# Patient Record
Sex: MALE | Race: WHITE | NOT HISPANIC OR LATINO | Employment: FULL TIME | URBAN - METROPOLITAN AREA
[De-identification: names, ages, dates, MRNs, and addresses within clinical notes are randomized per-mention and may not be internally consistent; named-entity substitution may affect disease eponyms.]

---

## 2017-03-02 ENCOUNTER — OFFICE VISIT (OUTPATIENT)
Dept: BARIATRICS/WEIGHT MGMT | Facility: CLINIC | Age: 42
End: 2017-03-02

## 2017-03-02 VITALS
SYSTOLIC BLOOD PRESSURE: 153 MMHG | WEIGHT: 292 LBS | HEART RATE: 70 BPM | TEMPERATURE: 98.6 F | HEIGHT: 70 IN | BODY MASS INDEX: 41.8 KG/M2 | DIASTOLIC BLOOD PRESSURE: 94 MMHG | RESPIRATION RATE: 18 BRPM

## 2017-03-02 DIAGNOSIS — IMO0001 ELEVATED BLOOD PRESSURE: ICD-10-CM

## 2017-03-02 DIAGNOSIS — R06.83 SNORING: ICD-10-CM

## 2017-03-02 DIAGNOSIS — E66.01 OBESITY, CLASS III, BMI 40-49.9 (MORBID OBESITY) (HCC): Primary | ICD-10-CM

## 2017-03-02 DIAGNOSIS — K21.9 GASTROESOPHAGEAL REFLUX DISEASE, ESOPHAGITIS PRESENCE NOT SPECIFIED: ICD-10-CM

## 2017-03-02 DIAGNOSIS — Z71.3 DIETARY COUNSELING: ICD-10-CM

## 2017-03-02 DIAGNOSIS — R53.82 CHRONIC FATIGUE: ICD-10-CM

## 2017-03-02 PROCEDURE — 99024 POSTOP FOLLOW-UP VISIT: CPT | Performed by: NURSE PRACTITIONER

## 2017-03-02 NOTE — PROGRESS NOTES
[facility] BARIATRIC SURGERY  [address]  [facility] [address]  [address]  [facility] [address]  Dept: [phone]  3/2/2017      Logan Fuchs.  [id number]  [id number]  1975  male      Chief Complaint   Patient presents with   • Follow-up     s/p gastric sleeve        BH Post-Op Bariatric Surgery:   Logan Fuchs is status post laparopscopic Sleeve procedure, performed on 11/14/16    HPI:   Today's weight is 292 lb (132 kg) pounds, today's BMI is Body mass index is 42.2 kg/(m^2)., he has a  loss of 39 pounds since the last visit and his weight loss since surgery is 97 pounds. The patient reports a decreased portion size and loss of appetite.      Logan Fuchs denies nausea, vomiting, dysphagia, abdominal pain or heartburn. Patient will only have regurgitation if he eats too fast.     Diet and Exercise: Diet history reviewed and discussed with the patient. Weight loss/gains to date discussed with the patient. The patient states they are eating 65-80 grams of protein per day. He reports eating 3 meals per day, a typical portion size of 1/2 cup, eating 2-3 snacks per day, drinking 5 or more 8-oz. glasses of water per day, no carbonated beverage consumption and exercising regularly- has done some weight training but doing more cardio; thinking about getting a bike for his house.     Supplements: ran out of bariatric MVI about a month ago so took OTC but purchased more BA today.     Review of Systems   All other systems reviewed and are negative.      Patient Active Problem List   Diagnosis   • Obesity, Class III, BMI 40-49.9 (morbid obesity)   • GERD (gastroesophageal reflux disease)   • Elevated blood pressure   • Snoring   • Knee pain   • Dietary counseling   • Chronic fatigue       The following portions of the patient's history were reviewed and updated as appropriate: allergies, current medications, past family  history, past medical history, past social history, past surgical history and problem list.    Vitals:    03/02/17 1326   BP: 153/94   Pulse: 70   Resp: 18   Temp: 98.6 °F (37 °C)       Physical Exam   Constitutional: He is oriented to person, place, and time. He appears well-developed and well-nourished.   HENT:   Head: Normocephalic and atraumatic.   Eyes: EOM are normal.   Cardiovascular: Normal rate, regular rhythm and normal heart sounds.    Pulmonary/Chest: Effort normal and breath sounds normal.   Abdominal: Soft. Bowel sounds are normal. He exhibits no distension. There is no tenderness.   Musculoskeletal: Normal range of motion.   Neurological: He is alert and oriented to person, place, and time.   Skin: Skin is warm and dry.   Psychiatric: He has a normal mood and affect. His behavior is normal. Judgment and thought content normal.   Vitals reviewed.        Assessment:   Post-op, the patient is doing well.     Plan:     Encouraged patient to keep up the great work- continue with plenty of lean protein through small frequent meals. Continue with plenty of water and vitamins. Increase exercise as tolerated.  Activity restrictions: none.   Recommended patient be sure to get at least 70 grams of protein per day by eating small, frequent meals all with high lean protein choices. Be sure to limit/cut back on daily carbohydrate intake. Discussed with the patient the recommended amount of water per day to intake- half of body weight in ounces. Reviewed vitamin requirements. Be sure to do routine exercise, 150 minutes per week minimum, including both cardio and strength training.     Instructions / Recommendations: dietary counseling recommended, recommended a daily protein intake of  grams, vitamin supplement(s) recommended, recommended exercising at least 150 minutes per week, behavior modifications recommended and instructed to call the office for concerns, questions, or problems.     The patient was  instructed to follow up in 3 months.     The patient was counseled regarding. Total time spent face to face was 10 minutes and more than half the time was spent counseling.

## 2017-05-11 ENCOUNTER — OFFICE VISIT (OUTPATIENT)
Dept: BARIATRICS/WEIGHT MGMT | Facility: CLINIC | Age: 42
End: 2017-05-11

## 2017-05-11 VITALS
HEIGHT: 70 IN | DIASTOLIC BLOOD PRESSURE: 81 MMHG | HEART RATE: 75 BPM | SYSTOLIC BLOOD PRESSURE: 134 MMHG | TEMPERATURE: 98.5 F | RESPIRATION RATE: 16 BRPM | WEIGHT: 265 LBS | BODY MASS INDEX: 37.94 KG/M2

## 2017-05-11 DIAGNOSIS — R53.82 CHRONIC FATIGUE: ICD-10-CM

## 2017-05-11 DIAGNOSIS — IMO0001 ELEVATED BLOOD PRESSURE: ICD-10-CM

## 2017-05-11 DIAGNOSIS — Z71.3 DIETARY COUNSELING: ICD-10-CM

## 2017-05-11 DIAGNOSIS — E66.9 OBESITY (BMI 30-39.9): Primary | ICD-10-CM

## 2017-05-11 DIAGNOSIS — R06.83 SNORING: ICD-10-CM

## 2017-05-11 PROCEDURE — 99024 POSTOP FOLLOW-UP VISIT: CPT | Performed by: NURSE PRACTITIONER

## 2017-08-07 ENCOUNTER — OFFICE VISIT (OUTPATIENT)
Dept: BARIATRICS/WEIGHT MGMT | Facility: CLINIC | Age: 42
End: 2017-08-07

## 2017-08-07 VITALS
DIASTOLIC BLOOD PRESSURE: 89 MMHG | WEIGHT: 234 LBS | HEART RATE: 79 BPM | HEIGHT: 70 IN | TEMPERATURE: 98.3 F | BODY MASS INDEX: 33.5 KG/M2 | SYSTOLIC BLOOD PRESSURE: 148 MMHG | RESPIRATION RATE: 18 BRPM

## 2017-08-07 DIAGNOSIS — E66.9 OBESITY (BMI 30-39.9): Primary | ICD-10-CM

## 2017-08-07 DIAGNOSIS — R06.83 SNORING: ICD-10-CM

## 2017-08-07 DIAGNOSIS — IMO0001 ELEVATED BLOOD PRESSURE: ICD-10-CM

## 2017-08-07 DIAGNOSIS — Z71.3 DIETARY COUNSELING: ICD-10-CM

## 2017-08-07 DIAGNOSIS — R53.82 CHRONIC FATIGUE: ICD-10-CM

## 2017-08-07 PROCEDURE — 99213 OFFICE O/P EST LOW 20 MIN: CPT | Performed by: NURSE PRACTITIONER

## 2017-08-07 NOTE — PROGRESS NOTES
[facility]ATR[facility] BARIATRIC SURGERY  [address]  [facility] [id number] [phone]  [address]  [facility] [address] [phone]  Dept: [phone]  8/7/2017      Logan Fuchs.  [id number]  [id number]  1975  male      Chief Complaint   Patient presents with   • Follow-up     s/p gastric sleeve        BH Post-Op Bariatric Surgery:   Logan Fuchs is status post laparopscopic Sleeve procedure, performed on 11/14/16    HPI:   Today's weight is 234 lb (106 kg) pounds, today's BMI is Body mass index is 33.82 kg/(m^2)., he has a  loss of 31 pounds since the last visit and his weight loss since surgery is 145 pounds. The patient reports a decreased portion size and loss of appetite.      Logan Fuchs denies nausea, vomiting, dysphagia, abdominal pain or heartburn.     Diet and Exercise: Diet history reviewed and discussed with the patient. Weight loss/gains to date discussed with the patient. The patient states they are eating 90 grams of protein per day. He reports eating 4 meals per day, a typical portion size of 1 cup, eating 2 snacks per day, drinking 5 or more 8-oz. glasses of water per day, no carbonated beverage consumption and exercising regularly- walking 5 days per week.     Supplements: Gummy OTC MTV, BA MTV before he ran out a few weeks ago    Review of Systems   Constitutional: Positive for appetite change. Negative for fatigue and unexpected weight change.   HENT: Negative.    Eyes: Negative.    Respiratory: Negative.    Cardiovascular: Negative.  Negative for leg swelling.   Gastrointestinal: Negative for abdominal distention, abdominal pain, constipation, diarrhea, nausea and vomiting.   Genitourinary: Negative for difficulty urinating, frequency and urgency.   Musculoskeletal: Negative for back pain.   Skin: Negative.    Psychiatric/Behavioral: Negative.    All other systems reviewed and are  negative.      Patient Active Problem List   Diagnosis   • Elevated blood pressure   • Snoring   • Knee pain   • Dietary counseling   • Chronic fatigue   • Obesity (BMI 30-39.9)       The following portions of the patient's history were reviewed and updated as appropriate: allergies, current medications, past family history, past medical history, past social history, past surgical history and problem list.    Vitals:    08/07/17 1332   BP: 148/89   Pulse: 79   Resp: 18   Temp: 98.3 °F (36.8 °C)       Physical Exam   Constitutional: He appears well-developed and well-nourished.   Cardiovascular: Normal rate, regular rhythm and normal heart sounds.    Pulmonary/Chest: Effort normal and breath sounds normal. No respiratory distress. He has no wheezes.   Abdominal: Soft. Bowel sounds are normal. He exhibits no distension. There is no tenderness. No hernia.   Neurological: He is alert.   Psychiatric: He has a normal mood and affect. His behavior is normal.   Nursing note and vitals reviewed.        Assessment:   Post-op, the patient is doing well.     Encounter Diagnoses   Name Primary?   • Obesity (BMI 30-39.9) Yes   • Snoring    • Elevated blood pressure    • Dietary counseling    • Chronic fatigue        Plan:     Encouraged patient to be sure to get plenty of lean protein per day through small frequent meals all with a protein source.   Activity restrictions: none.   Recommended patient be sure to get at least 70 grams of protein per day by eating small, frequent meals all with high lean protein choices. Be sure to limit/cut back on daily carbohydrate intake. Discussed with the patient the recommended amount of water per day to intake- half of body weight in ounces. Reviewed vitamin requirements. Be sure to do routine exercise, 150 minutes per week minimum, including both cardio and strength training.     Patient encouraged to vary his exercise and to increase his carbohydrate intake from 15grams per day to a minimum  of 60 grams per day. Encouraged to keep up the good work regarding protein intake and fluid intake.    Instructions / Recommendations: dietary counseling recommended, recommended a daily protein intake of  grams, vitamin supplement(s) recommended, recommended exercising at least 150 minutes per week, behavior modifications recommended and instructed to call the office for concerns, questions, or problems.     The patient was instructed to follow up in 3 months .     The patient was counseled regarding dietary intake, protein intake, vitamin supplementation, lab work, exercise. Total time spent face to face was 20 minutes and more than half the time was spent counseling.

## 2017-08-22 LAB
ALBUMIN SERPL-MCNC: 4.1 G/DL (ref 3.5–5.5)
ALBUMIN/GLOB SERPL: 1.8 {RATIO} (ref 1.2–2.2)
ALP SERPL-CCNC: 100 IU/L (ref 39–117)
ALT SERPL-CCNC: 34 IU/L (ref 0–44)
AST SERPL-CCNC: 31 IU/L (ref 0–40)
BASOPHILS # BLD AUTO: 0.1 X10E3/UL (ref 0–0.2)
BASOPHILS NFR BLD AUTO: 1 %
BILIRUB SERPL-MCNC: 1.4 MG/DL (ref 0–1.2)
BUN SERPL-MCNC: 14 MG/DL (ref 6–24)
BUN/CREAT SERPL: 15 (ref 9–20)
CALCIUM SERPL-MCNC: 9.1 MG/DL (ref 8.7–10.2)
CHLORIDE SERPL-SCNC: 101 MMOL/L (ref 96–106)
CO2 SERPL-SCNC: 24 MMOL/L (ref 18–29)
CREAT SERPL-MCNC: 0.92 MG/DL (ref 0.76–1.27)
EOSINOPHIL # BLD AUTO: 0.1 X10E3/UL (ref 0–0.4)
EOSINOPHIL NFR BLD AUTO: 2 %
ERYTHROCYTE [DISTWIDTH] IN BLOOD BY AUTOMATED COUNT: 13.4 % (ref 12.3–15.4)
GLOBULIN SER CALC-MCNC: 2.3 G/DL (ref 1.5–4.5)
GLUCOSE SERPL-MCNC: 81 MG/DL (ref 65–99)
HCT VFR BLD AUTO: 46 % (ref 37.5–51)
HGB BLD-MCNC: 15.5 G/DL (ref 12.6–17.7)
IMM GRANULOCYTES # BLD: 0 X10E3/UL (ref 0–0.1)
IMM GRANULOCYTES NFR BLD: 0 %
LYMPHOCYTES # BLD AUTO: 1.3 X10E3/UL (ref 0.7–3.1)
LYMPHOCYTES NFR BLD AUTO: 27 %
MCH RBC QN AUTO: 31.4 PG (ref 26.6–33)
MCHC RBC AUTO-ENTMCNC: 33.7 G/DL (ref 31.5–35.7)
MCV RBC AUTO: 93 FL (ref 79–97)
MONOCYTES # BLD AUTO: 0.4 X10E3/UL (ref 0.1–0.9)
MONOCYTES NFR BLD AUTO: 9 %
NEUTROPHILS # BLD AUTO: 3.1 X10E3/UL (ref 1.4–7)
NEUTROPHILS NFR BLD AUTO: 61 %
PLATELET # BLD AUTO: 257 X10E3/UL (ref 150–379)
POTASSIUM SERPL-SCNC: 4.5 MMOL/L (ref 3.5–5.2)
PROT SERPL-MCNC: 6.4 G/DL (ref 6–8.5)
RBC # BLD AUTO: 4.93 X10E6/UL (ref 4.14–5.8)
SODIUM SERPL-SCNC: 141 MMOL/L (ref 134–144)
WBC # BLD AUTO: 5 X10E3/UL (ref 3.4–10.8)

## 2017-11-06 ENCOUNTER — OFFICE VISIT (OUTPATIENT)
Dept: BARIATRICS/WEIGHT MGMT | Facility: CLINIC | Age: 42
End: 2017-11-06

## 2017-11-06 VITALS
BODY MASS INDEX: 31.78 KG/M2 | WEIGHT: 222 LBS | TEMPERATURE: 97.6 F | HEIGHT: 70 IN | RESPIRATION RATE: 18 BRPM | HEART RATE: 72 BPM | SYSTOLIC BLOOD PRESSURE: 145 MMHG | DIASTOLIC BLOOD PRESSURE: 90 MMHG

## 2017-11-06 DIAGNOSIS — Z71.3 DIETARY COUNSELING: ICD-10-CM

## 2017-11-06 DIAGNOSIS — M25.569 CHRONIC KNEE PAIN, UNSPECIFIED LATERALITY: ICD-10-CM

## 2017-11-06 DIAGNOSIS — G89.29 CHRONIC KNEE PAIN, UNSPECIFIED LATERALITY: ICD-10-CM

## 2017-11-06 DIAGNOSIS — R53.82 CHRONIC FATIGUE: ICD-10-CM

## 2017-11-06 DIAGNOSIS — E66.9 OBESITY (BMI 30-39.9): Primary | ICD-10-CM

## 2017-11-06 PROCEDURE — 99213 OFFICE O/P EST LOW 20 MIN: CPT | Performed by: NURSE PRACTITIONER

## 2017-11-06 NOTE — PROGRESS NOTES
[facility]ATR[facility] BARIATRIC SURGERY  [address]  [facility] [id number] [phone]  [address]  [facility] [address] [phone]  Dept: [phone]  11/6/2017      Logan Fuchs.  [id number]  [id number]  1975  male      Chief Complaint   Patient presents with   • Follow-up     1 year followup [facility] Post-Op Bariatric Surgery:   Logan Fuchs is status post Laparoscopic Sleeve procedure, performed on 11/14/16     HPI:   Today's weight is 222 lb (101 kg) pounds, today's BMI is Body mass index is 32.08 kg/(m^2)., he has a  loss of 12 pounds since the last visit and his weight loss since surgery is 159 pounds. The patient reports a decreased portion size and loss of appetite.      Logan Fuchs denies nausea, vomiting, dysphagia, abdominal pain or heartburn.     Diet and Exercise: Diet history reviewed and discussed with the patient. Weight loss/gains to date discussed with the patient. The patient states they are eating 90 grams of protein per day. He reports eating 5 meals per day, a typical portion size of 1/2 cup, eating 3 snacks per day, drinking 5 or more 8-oz. glasses of water per day, no carbonated beverage consumption and exercising regularly- cardio 1-2 times per week.    Reports that he has been looking into a . Plans on going in for stem cell injections into his knees.     Supplements: BA MTV with iron.     Review of Systems   Constitutional: Positive for appetite change. Negative for fatigue and unexpected weight change.   HENT: Negative.    Eyes: Negative.    Respiratory: Negative.    Cardiovascular: Negative.  Negative for leg swelling.   Gastrointestinal: Negative for abdominal distention, abdominal pain, constipation, diarrhea, nausea and vomiting.   Genitourinary: Negative for difficulty urinating, frequency and urgency.   Musculoskeletal: Negative for back pain.   Skin: Negative.     Psychiatric/Behavioral: Negative.    All other systems reviewed and are negative.      Patient Active Problem List   Diagnosis   • Elevated blood pressure   • Snoring   • Knee pain   • Dietary counseling   • Chronic fatigue   • Obesity (BMI 30-39.9)       Past Medical History:   Diagnosis Date   • Arthritis    • Cellulitis 2013   • Diarrhea    • Heartburn    • Knee pain    • PONV (postoperative nausea and vomiting)        The following portions of the patient's history were reviewed and updated as appropriate: allergies, current medications, past family history, past medical history, past social history, past surgical history and problem list.    Vitals:    11/06/17 1324   BP: 145/90   Pulse: 72   Resp: 18   Temp: 97.6 °F (36.4 °C)       Physical Exam   Constitutional: He appears well-developed and well-nourished.   Neck: No thyromegaly present.   Cardiovascular: Normal rate, regular rhythm and normal heart sounds.    Pulmonary/Chest: Effort normal and breath sounds normal. No respiratory distress. He has no wheezes.   Abdominal: Soft. Bowel sounds are normal. He exhibits no distension. There is no tenderness. There is no guarding. No hernia.   Musculoskeletal: He exhibits no edema or tenderness.   Neurological: He is alert.   Skin: Skin is warm and dry. No rash noted. No erythema.   Psychiatric: He has a normal mood and affect. His behavior is normal.   Nursing note and vitals reviewed.        Assessment:   Post-op, the patient is doing well.     Encounter Diagnoses   Name Primary?   • Obesity (BMI 30-39.9) Yes   • Chronic knee pain, unspecified laterality    • Dietary counseling    • Chronic fatigue        Plan:     Encouraged patient to be sure to get plenty of lean protein per day through small frequent meals all with a protein source.   Activity restrictions: none.   Recommended patient be sure to get at least 70 grams of protein per day by eating small, frequent meals all with high lean protein choices. Be  sure to limit/cut back on daily carbohydrate intake. Discussed with the patient the recommended amount of water per day to intake- half of body weight in ounces. Reviewed vitamin requirements. Be sure to do routine exercise, 150 minutes per week minimum, including both cardio and strength training.     Instructions / Recommendations: dietary counseling recommended, recommended a daily protein intake of  grams, vitamin supplement(s) recommended, recommended exercising at least 150 minutes per week, behavior modifications recommended and instructed to call the office for concerns, questions, or problems.     The patient was instructed to follow up in 3 months .     The patient was counseled regarding exercise, fluid intake, lab work. Total time spent face to face was 20 minutes and 15 minutes was spent counseling.

## 2018-05-07 ENCOUNTER — OFFICE VISIT (OUTPATIENT)
Dept: BARIATRICS/WEIGHT MGMT | Facility: CLINIC | Age: 43
End: 2018-05-07

## 2018-05-07 VITALS
WEIGHT: 212 LBS | DIASTOLIC BLOOD PRESSURE: 82 MMHG | SYSTOLIC BLOOD PRESSURE: 132 MMHG | TEMPERATURE: 98.8 F | HEART RATE: 77 BPM | BODY MASS INDEX: 31.4 KG/M2 | HEIGHT: 69 IN

## 2018-05-07 DIAGNOSIS — E66.9 OBESITY (BMI 30-39.9): Primary | ICD-10-CM

## 2018-05-07 DIAGNOSIS — E66.9 OBESITY, CLASS I, BMI 30-34.9: Primary | ICD-10-CM

## 2018-05-07 DIAGNOSIS — Z71.3 DIETARY COUNSELING: ICD-10-CM

## 2018-05-07 PROCEDURE — 99213 OFFICE O/P EST LOW 20 MIN: CPT | Performed by: NURSE PRACTITIONER

## 2018-05-07 NOTE — PROGRESS NOTES
[facility] BARIATR[facility] BARIATRIC SURGERY  [address]  [facility] [address] [phone]  [address]  [facility] [address] [phone]  Dept: [phone]  5/7/2018      Logan Fuchs.  [id number]  [id number]  1975  male      Chief Complaint   Patient presents with   • Follow-up       BH Post-Op Bariatric Surgery:   Logan Fuchs is status post Laparoscopic Sleeve procedure, performed on 11/14/16     HPI:   Today's weight is 96.2 kg (212 lb) pounds, today's BMI is Body mass index is 31.31 kg/m²., he has a  loss of 10 pounds since the last visit and his weight loss since surgery is 169 pounds. The patient reports a decreased portion size and loss of appetite.      Logan Fuchs denies nausea, vomiting, dysphagia, abdominal pain or heartburn.     Diet and Exercise: Diet history reviewed and discussed with the patient. Weight loss/gains to date discussed with the patient. The patient states they are eating 60 grams of protein per day. He reports eating 4-5 meals per day, a typical portion size of 1 cup, eating 5 snacks per day, drinking 5 or more 8-oz. glasses of water per day, no carbonated beverage consumption and exercising regularly- cardio 3 days per week.    Needs both knees replaced but is young so is having trouble finding a surgeon.    Supplements: BA MTV with iron and calcium,.     Review of Systems   Constitutional: Positive for appetite change. Negative for fatigue and unexpected weight change.   HENT: Negative.    Eyes: Negative.    Respiratory: Negative.    Cardiovascular: Negative.  Negative for leg swelling.   Gastrointestinal: Negative for abdominal distention, abdominal pain, constipation, diarrhea, nausea and vomiting.   Genitourinary: Negative for difficulty urinating, frequency and urgency.   Musculoskeletal: Negative for back pain.   Skin: Negative.    Psychiatric/Behavioral: Negative.    All other systems  reviewed and are negative.      Patient Active Problem List   Diagnosis   • Elevated blood pressure   • Snoring   • Knee pain   • Dietary counseling   • Chronic fatigue   • Obesity (BMI 30-39.9)       Past Medical History:   Diagnosis Date   • Arthritis    • Cellulitis 2013   • Diarrhea    • Heartburn    • Knee pain    • PONV (postoperative nausea and vomiting)        The following portions of the patient's history were reviewed and updated as appropriate: allergies, current medications, past family history, past medical history, past social history, past surgical history and problem list.    Vitals:    05/07/18 1325   BP: 132/82   Pulse: 77   Temp: 98.8 °F (37.1 °C)       Physical Exam   Constitutional: He appears well-developed and well-nourished.   Neck: No thyromegaly present.   Cardiovascular: Normal rate, regular rhythm and normal heart sounds.    Pulmonary/Chest: Effort normal and breath sounds normal. No respiratory distress. He has no wheezes.   Abdominal: Soft. Bowel sounds are normal. He exhibits no distension. There is no tenderness. There is no guarding. No hernia.   Musculoskeletal: He exhibits no edema or tenderness.   Neurological: He is alert.   Skin: Skin is warm and dry. No rash noted. No erythema.   Psychiatric: He has a normal mood and affect. His behavior is normal.   Nursing note and vitals reviewed.      Assessment:   Post-op, the patient is doing well.     Encounter Diagnoses   Name Primary?   • Obesity (BMI 30-39.9) Yes   • Dietary counseling        Plan:     Encouraged patient to be sure to get plenty of lean protein per day through small frequent meals all with a protein source.   Activity restrictions: none.   Recommended patient be sure to get at least 70 grams of protein per day by eating small, frequent meals all with high lean protein choices. Be sure to limit/cut back on daily carbohydrate intake. Discussed with the patient the recommended amount of water per day to intake- half of  body weight in ounces. Reviewed vitamin requirements. Be sure to do routine exercise, 150 minutes per week minimum, including both cardio and strength training.     Instructions / Recommendations: dietary counseling recommended, recommended a daily protein intake of  grams, vitamin supplement(s) recommended, recommended exercising at least 150 minutes per week, behavior modifications recommended and instructed to call the office for concerns, questions, or problems.     The patient was instructed to follow up in 6 months .     The patient was counseled regarding exercise, fluid intake, lab work. Total time spent face to face was 20 minutes and 15 minutes was spent counseling.

## 2018-11-12 ENCOUNTER — OFFICE VISIT (OUTPATIENT)
Dept: BARIATRICS/WEIGHT MGMT | Facility: CLINIC | Age: 43
End: 2018-11-12

## 2018-11-12 VITALS
TEMPERATURE: 98.4 F | HEART RATE: 70 BPM | DIASTOLIC BLOOD PRESSURE: 93 MMHG | RESPIRATION RATE: 18 BRPM | BODY MASS INDEX: 31.03 KG/M2 | HEIGHT: 69 IN | SYSTOLIC BLOOD PRESSURE: 155 MMHG | WEIGHT: 209.5 LBS

## 2018-11-12 DIAGNOSIS — Z71.3 DIETARY COUNSELING: ICD-10-CM

## 2018-11-12 DIAGNOSIS — R53.82 CHRONIC FATIGUE: ICD-10-CM

## 2018-11-12 DIAGNOSIS — E66.9 OBESITY (BMI 30-39.9): Primary | ICD-10-CM

## 2018-11-12 DIAGNOSIS — R11.10 REGURGITATION AND RECHEWING: ICD-10-CM

## 2018-11-12 PROCEDURE — 99213 OFFICE O/P EST LOW 20 MIN: CPT | Performed by: NURSE PRACTITIONER

## 2018-11-12 NOTE — PROGRESS NOTES
[facility] BAR[facility]ATR[facility] BARIATRIC SURGERY  [address]  [facility] [address] [phone]  [address]  [facility] [address] [phone]  Dept: [phone]  11/12/2018      Logan Fuchs.  [id number]  [id number]  1975  male      Chief Complaint   Patient presents with   • Follow-up     2 year sleeve follow up        BH Post-Op Bariatric Surgery:   Logan Fuchs is status post Laparoscopic Sleeve procedure, performed on 11/14/16     HPI:   Today's weight is 95 kg (209 lb 8 oz) pounds, today's BMI is Body mass index is 30.92 kg/m²., he has a  loss of 3.5 pounds since the last visit and his weight loss since surgery is 177 pounds. The patient reports a decreased portion size and loss of appetite.      Logan Fuchs denies nausea, vomiting, dysphagia, abdominal pain or heartburn.     Diet and Exercise: Diet history reviewed and discussed with the patient. Weight loss/gains to date discussed with the patient. The patient states they are eating 70 grams of protein per day. He reports eating 3 meals per day, a typical portion size of 1/2 cup, eating 4 snacks per day, drinking 5-6 or more 8-oz. glasses of water per day, no carbonated beverage consumption and exercising regularly.     Patient reports that for the last 6 months he has been getting some foods caught. He has been having a lot of foods coming up back. He is struggling getting meat down. Reports that over the last two months that it has been happening nearly every day. He reports having a total knee replacement and despite eating nearly continuously throughout the day he has a hard time maintaining his weight as often food is coming back up so often.     Supplements: BA MTV with iron or OTC flintstone gummies.     Review of Systems   Constitutional: Positive for appetite change. Negative for fatigue and unexpected weight change.   HENT: Positive for trouble swallowing.     Eyes: Negative.    Respiratory: Negative.    Cardiovascular: Negative.  Negative for leg swelling.   Gastrointestinal: Negative for abdominal distention, abdominal pain, constipation, diarrhea, nausea and vomiting.        Regurgitation   Genitourinary: Negative for difficulty urinating, frequency and urgency.   Musculoskeletal: Negative for back pain.   Skin: Negative.    Psychiatric/Behavioral: Negative.    All other systems reviewed and are negative.      Patient Active Problem List   Diagnosis   • Elevated blood pressure   • Snoring   • Knee pain   • Dietary counseling   • Chronic fatigue   • Obesity (BMI 30-39.9)   • Regurgitation and rechewing       Past Medical History:   Diagnosis Date   • Arthritis    • Cellulitis 2013   • Diarrhea    • Heartburn    • Knee pain    • PONV (postoperative nausea and vomiting)        The following portions of the patient's history were reviewed and updated as appropriate: allergies, current medications, past family history, past medical history, past social history, past surgical history and problem list.    Vitals:    11/12/18 1129   BP: 155/93   Pulse: 70   Resp: 18   Temp: 98.4 °F (36.9 °C)       Physical Exam   Constitutional: He appears well-developed and well-nourished.   Neck: No thyromegaly present.   Cardiovascular: Normal rate, regular rhythm and normal heart sounds.   Pulmonary/Chest: Effort normal and breath sounds normal. No respiratory distress. He has no wheezes.   Abdominal: Soft. Bowel sounds are normal. He exhibits no distension. There is no tenderness. There is no guarding. No hernia.   Musculoskeletal: He exhibits no edema or tenderness.   Neurological: He is alert.   Skin: Skin is warm and dry. No rash noted. No erythema.   Psychiatric: He has a normal mood and affect. His behavior is normal.   Nursing note and vitals reviewed.      Assessment:   Post-op, the patient is doing well regarding choices but is not tolerating solid protein.     Encounter  Diagnoses   Name Primary?   • Obesity (BMI 30-39.9) Yes   • Chronic fatigue    • Dietary counseling    • Regurgitation and rechewing        Plan:   Plan to have a scope done to rule out stricture and prevent regurgitation of food. Annual labs written today. Discussed how lack of protein is likely preventing satiety and plan to transition back to solid foods and bariatric vitamins post scope.   Encouraged patient to be sure to get plenty of lean protein per day through small frequent meals all with a protein source.   Activity restrictions: none.   Recommended patient be sure to get at least 70 grams of protein per day by eating small, frequent meals all with high lean protein choices. Be sure to limit/cut back on daily carbohydrate intake. Discussed with the patient the recommended amount of water per day to intake- half of body weight in ounces. Reviewed vitamin requirements. Be sure to do routine exercise, 150 minutes per week minimum, including both cardio and strength training.     Instructions / Recommendations: dietary counseling recommended, recommended a daily protein intake of  grams, vitamin supplement(s) recommended, recommended exercising at least 150 minutes per week, behavior modifications recommended and instructed to call the office for concerns, questions, or problems.     The patient was instructed to follow up in 6 months .     The patient was counseled regarding exercise, fluid intake, lab work, protein intake, scope risks and AE's. Total time spent face to face was 20 minutes and 15 minutes was spent counseling.

## 2018-11-16 DIAGNOSIS — R11.10 REGURGITATION AND RECHEWING: ICD-10-CM

## 2018-11-16 DIAGNOSIS — E66.9 OBESITY (BMI 30-39.9): ICD-10-CM

## 2018-11-16 DIAGNOSIS — R13.10 DYSPHAGIA, UNSPECIFIED TYPE: Primary | ICD-10-CM

## 2018-12-04 ENCOUNTER — OUTSIDE FACILITY SERVICE (OUTPATIENT)
Dept: BARIATRICS/WEIGHT MGMT | Facility: CLINIC | Age: 43
End: 2018-12-04

## 2018-12-04 ENCOUNTER — LAB REQUISITION (OUTPATIENT)
Dept: LAB | Facility: HOSPITAL | Age: 43
End: 2018-12-04

## 2018-12-04 DIAGNOSIS — Z00.00 ENCOUNTER FOR GENERAL ADULT MEDICAL EXAMINATION WITHOUT ABNORMAL FINDINGS: ICD-10-CM

## 2018-12-04 PROCEDURE — 43245 EGD DILATE STRICTURE: CPT | Performed by: SURGERY

## 2018-12-04 PROCEDURE — 43239 EGD BIOPSY SINGLE/MULTIPLE: CPT | Performed by: SURGERY

## 2018-12-04 PROCEDURE — 87081 CULTURE SCREEN ONLY: CPT | Performed by: SURGERY

## 2018-12-04 RX ORDER — PANTOPRAZOLE SODIUM 40 MG/1
40 TABLET, DELAYED RELEASE ORAL DAILY
Qty: 30 TABLET | Refills: 5 | Status: SHIPPED | OUTPATIENT
Start: 2018-12-04 | End: 2019-06-02

## 2018-12-04 RX ORDER — METOCLOPRAMIDE 10 MG/1
10 TABLET ORAL
Qty: 120 TABLET | Refills: 0 | Status: SHIPPED | OUTPATIENT
Start: 2018-12-04

## 2018-12-05 LAB — UREASE TISS QL: NEGATIVE
